# Patient Record
Sex: FEMALE | Race: OTHER | NOT HISPANIC OR LATINO | URBAN - METROPOLITAN AREA
[De-identification: names, ages, dates, MRNs, and addresses within clinical notes are randomized per-mention and may not be internally consistent; named-entity substitution may affect disease eponyms.]

---

## 2021-07-15 ENCOUNTER — OUTPATIENT (OUTPATIENT)
Dept: OUTPATIENT SERVICES | Facility: HOSPITAL | Age: 56
LOS: 1 days | End: 2021-07-15
Payer: MEDICARE

## 2021-07-15 ENCOUNTER — RESULT REVIEW (OUTPATIENT)
Age: 56
End: 2021-07-15

## 2021-07-15 ENCOUNTER — APPOINTMENT (OUTPATIENT)
Dept: ORTHOPEDIC SURGERY | Facility: CLINIC | Age: 56
End: 2021-07-15
Payer: MEDICARE

## 2021-07-15 VITALS
HEIGHT: 63 IN | OXYGEN SATURATION: 98 % | HEART RATE: 100 BPM | WEIGHT: 172 LBS | DIASTOLIC BLOOD PRESSURE: 78 MMHG | BODY MASS INDEX: 30.48 KG/M2 | TEMPERATURE: 98.6 F | SYSTOLIC BLOOD PRESSURE: 135 MMHG

## 2021-07-15 DIAGNOSIS — S80.12XA CONTUSION OF LEFT LOWER LEG, INITIAL ENCOUNTER: ICD-10-CM

## 2021-07-15 DIAGNOSIS — Z78.9 OTHER SPECIFIED HEALTH STATUS: ICD-10-CM

## 2021-07-15 DIAGNOSIS — Z87.09 PERSONAL HISTORY OF OTHER DISEASES OF THE RESPIRATORY SYSTEM: ICD-10-CM

## 2021-07-15 DIAGNOSIS — S83.412A SPRAIN OF MEDIAL COLLATERAL LIGAMENT OF LEFT KNEE, INITIAL ENCOUNTER: ICD-10-CM

## 2021-07-15 DIAGNOSIS — S89.92XA UNSPECIFIED INJURY OF LEFT LOWER LEG, INITIAL ENCOUNTER: ICD-10-CM

## 2021-07-15 PROCEDURE — 73590 X-RAY EXAM OF LOWER LEG: CPT

## 2021-07-15 PROCEDURE — 73590 X-RAY EXAM OF LOWER LEG: CPT | Mod: 26,LT

## 2021-07-15 PROCEDURE — 73610 X-RAY EXAM OF ANKLE: CPT

## 2021-07-15 PROCEDURE — 99204 OFFICE O/P NEW MOD 45 MIN: CPT

## 2021-07-15 PROCEDURE — 73610 X-RAY EXAM OF ANKLE: CPT | Mod: 26,LT

## 2021-07-15 RX ORDER — ALBUTEROL SULFATE 2.5 MG/3ML
(2.5 MG/3ML) SOLUTION RESPIRATORY (INHALATION)
Refills: 0 | Status: ACTIVE | COMMUNITY

## 2021-07-15 RX ORDER — ZILEUTON 600 MG/1
600 TABLET, FILM COATED, EXTENDED RELEASE ORAL
Refills: 0 | Status: ACTIVE | COMMUNITY

## 2021-07-15 RX ORDER — BUDESONIDE 0.25 MG/2ML
0.25 INHALANT ORAL
Refills: 0 | Status: ACTIVE | COMMUNITY
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
## 2021-07-15 NOTE — DISCUSSION/SUMMARY
[Medication Risks Reviewed] : Medication risks reviewed [de-identified] : The patient is a 55 year old woman presenting with an 8 day history of acute, left medial knee and shin pain.  MRI showing MCL sprain, but I believe her pain is mostly related to moderate contusion of the left posteromedial tibial shaft.  No evidence of compartment syndrome.\par \par Imaging/Diagnostics/Medical Records were interpreted and/or reviewed and results were reviewed with the patient in detail.  All questions were answered appropriately.\par \par The patient was counseled on the natural progression of the problem(s) today and potential complications of diagnoses.  The patient was provided reassurance today.\par \par After informed patient consent, it has been agreed upon to trial a course of conservative, nonoperative treatment.\par \par Discussed serial observation.\par \par The patient was counseled on Rest-Ice-Compression-Elevation (RICE).\par \par Patient instructed to offload area.  She was fitted with walking cane today.\par \par Patient was prescribed a short course of Etodolac.Appropriate use of medication was reviewed with the patient in detail. Risks, benefits, and adverse effects medication were discussed.\par \par If pain worsens, we discussed ruling out nondisplaced fracture.  MRI Tibia-Fibula ordered today. \par \par She was counsele don red flags warranting presentation to ED, or earlier follow-up.\par \par Follow-up in 2 weeks.  Pending progress, we discussed starting supervised PT.\par \par ------------------------------------------------------------------------------------------------------------------\par Patient appreciates and agrees with current plan.\par \par The patient's diagnosis above was evaluated by me, personally.  Diagnostic Testing and treatment options were discussed with the patient in detail.  The risks/benefits/potential complications of diagnostic testing/treatments were described in detail.  \par \par This note was generated using a mixture of manual typing and dragon medical dictation software.  A reasonable effort has been made for proofreading its contents, but typos may still remain.  If there are any questions or points of clarification needed please notify my office.\par \par \par >45 minutes of time was spent on total encounter.  >50% of the visit was spent on face-to-face counseling/coordination of care and medical-decision making for this patient.\par \par \par

## 2021-07-15 NOTE — PHYSICAL EXAM
[de-identified] : General: Well-nourished, well-developed, alert, and in no acute distress.\par Head: Normocephalic.\par Eyes: Pupils equal round reactive to light and accommodation, extraocular muscles intact, normal sclera.\par Nose: No nasal discharge.\par Cardiac: Regular rate. Extremities are warm and well perfused. Distal pulses are symmetric bilaterally.\par Respiratory: No labored breathing.\par Extremities: Sensation is intact distally bilaterally.  Distal pulses are symmetric bilaterally\par Lymphatic: No regional lymphadenopathy, no lymphedema\par Neurologic: No focal deficits\par Skin: Normal skin color, texture, and turgor\par Psychiatric: Normal affect\par MSK: as noted above/below\par \par \par RIGHT KNEE:\par \par Inspection: no bruising, swelling, erythema\par Joint Effusion:no \par ROM: Knee Flexion 130-140 , Knee Extension 0\par Palpation:No pain at joint line, patellar tendon, MFC/LFC, Medial/Lateral Tibial Plateau\par Leg Length Discrepancy:no\par Patella: no apprehension\par Distal Pulses: normal\par Lower Extremity Strength:normal, 5/5 \par Lower Extremity Reflexes:normal, 2+\par Lower Extremity Sensation: normal\par \par Special Tests:\par Eric:Negative \par Hermila: Negative\par Anterior Drawer:Negative\par Anterior Lachman:Negative\par Posterior Drawer:Negative \par Varus/Valgus:Negative, no instability\par \par LEFT KNEE:\par \par Inspection: no bruising, erythema\par Joint Effusion:TRACE\par ROM: Knee Flexion 120 WITH PAIN , Knee Extension +5\par Palpation:MIDL MEDIAL JOINT LINE PAIN, MCL FOOTPRINT TENDERNESS, No pain at patellar tendon, MFC/LFC, Medial/Lateral Tibial Plateau\par Leg Length Discrepancy:no\par Patella: no apprehension\par Distal Pulses: normal\par Lower Extremity Strength:KNEE EXTENSION 4+/5, KNEE FLEXION 5-/5\par Lower Extremity Reflexes:normal, 2+\par Lower Extremity Sensation: normal\par \par Special Tests:\par [facility]:Negative \par Hermila: Negative\par Anterior Drawer:Negative\par Posterior Drawer:Negative \par Varus/Valgus:PAIN WITH VALGUS STRESS, WITHOUT GAPPING\par \par RIGHT LEG:\par \par Inspection: no bruising, swelling or rash\par Bony Prominence: none\par Range of Motion: Knee flexion 130 degrees, knee extension 0 degrees\par Palpation: no tenderness posterior medial tibia, anterior tibial cortex,  no calf pain\par Leg length discrepancy: no\par Distal Pulses: Intact\par Lower extremity strength: Knee Extension 5/5 with no pain, Knee Flexion 5/5 with no pain, Hip Flexion 5/5 with no pain, Hip Abduction 5/5 with no pain, PlantarFlexion 5/5, Dorsiflexion 5/5, Eversion 5/5, Inversion 5/5  \par Lower extremity reflexes: 2 Plus \par Lower extremity sensation: Intact\par \par Special Test:\par Rose Sign: Negative\par Hurtado Test: Negative\par \par LEFT LEG:\par \par Inspection: no bruising, swelling or rash\par Bony Prominence: none\par Range of Motion: Knee flexion 120 degrees, knee extension 0 degrees\par Palpation: PAIN AT MID TO DISTAL THIRD POSTEROMEDIAL TIBIA, anterior tibial cortex,  no calf pain\par Leg length discrepancy: no\par Distal Pulses: Intact\par Lower extremity strength: Knee Extension 4+/5 with pain, Knee Flexion 5-/5 with no pain, Hip Flexion 5/5 with no pain, Hip Abduction 5/5 with no pain, PlantarFlexion 5/5, Dorsiflexion 4+/5, Eversion 5/5, Inversion 5/5 \par Lower extremity reflexes: 2 Plus \par Lower extremity sensation: Intact\par \par Special Test:\par Rose Sign: Negative\par Hurtado Test: Negative\par \par  [de-identified] : MRI Left Knee - Multiple views were reviewed with the patient in detail.\par \par 1.  Grade 1 ACL sprain\par 2.  Posterior horn medial meniscus degeneration\par 3.  Edema at the level of the popliteal hiatus and tendon series 301 image number 3\par 4.  Grade 2 MCL sprain\par \par Xray LEFT Tibia-Fibula and Ankle - Multiple views were reviewed with the patient in detail.  There is no acute fracture or dislocation.  There is no joint effusion.  Joint spaces are preserved. We will await formal radiology reading.\par \par

## 2021-07-16 RX ORDER — DICLOFENAC SODIUM 50 MG/1
50 TABLET, DELAYED RELEASE ORAL
Qty: 180 | Refills: 0 | Status: ACTIVE | COMMUNITY
Start: 2021-07-16 | End: 1900-01-01

## 2021-07-16 RX ORDER — ETODOLAC 400 MG/1
400 TABLET, FILM COATED ORAL TWICE DAILY
Qty: 60 | Refills: 3 | Status: DISCONTINUED | COMMUNITY
Start: 2021-07-15 | End: 2021-07-16

## 2021-07-29 ENCOUNTER — APPOINTMENT (OUTPATIENT)
Dept: ORTHOPEDIC SURGERY | Facility: CLINIC | Age: 56
End: 2021-07-29
Payer: MEDICARE

## 2021-07-29 VITALS
BODY MASS INDEX: 30.48 KG/M2 | HEIGHT: 63 IN | SYSTOLIC BLOOD PRESSURE: 130 MMHG | DIASTOLIC BLOOD PRESSURE: 80 MMHG | WEIGHT: 172 LBS | TEMPERATURE: 98.2 F | OXYGEN SATURATION: 98 % | HEART RATE: 103 BPM

## 2021-07-29 DIAGNOSIS — S80.12XD CONTUSION OF LEFT LOWER LEG, SUBSEQUENT ENCOUNTER: ICD-10-CM

## 2021-07-29 DIAGNOSIS — S83.412D SPRAIN OF MEDIAL COLLATERAL LIGAMENT OF LEFT KNEE, SUBSEQUENT ENCOUNTER: ICD-10-CM

## 2021-07-29 PROCEDURE — 99212 OFFICE O/P EST SF 10 MIN: CPT
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
## 2021-07-29 NOTE — DISCUSSION/SUMMARY
[Medication Risks Reviewed] : Medication risks reviewed [de-identified] : The patient is a 55 year old woman presenting with an 3 week history of acute, left medial knee and shin pain.  She likely has Grade II MCL sprain with proximo-medial tibia contusion\par \par Imaging/Diagnostics/Medical Records were interpreted and/or reviewed and results were reviewed with the patient in detail.  All questions were answered appropriately.\par \par The patient was counseled on the natural progression of the problem(s) today and potential complications of diagnoses.  The patient was provided reassurance today.\par \par Transition to hinged knee brace for next 4 weeks.  Patient given prescription today.\par \par As pain improves, start supervised PT in next 1-2 weeks.\par \par Patient was prescribed a course of physical therapy today.  The patient was also provided some general home exercises.  The patient was counseled on activity modification.\par \par Follow-up in 4 weeks.  Patient understands that MCL sprain may take 4-8 weeks to heal, and bone contusion may take 2-3 months or more to return to baseline\par \par ------------------------------------------------------------------------------------------------------------------\par Patient appreciates and agrees with current plan.\par \par The patient's diagnosis above was evaluated by me, personally.  Diagnostic Testing and treatment options were discussed with the patient in detail.  The risks/benefits/potential complications of diagnostic testing/treatments were described in detail.  \par \par This note was generated using a mixture of manual typing and dragon medical dictation software.  A reasonable effort has been made for proofreading its contents, but typos may still remain.  If there are any questions or points of clarification needed please notify my office.\par \par \par >15 minutes of time was spent on total encounter.  >50% of the visit was spent on face-to-face counseling/coordination of care and medical-decision making for this patient.\par \par \par

## 2021-07-29 NOTE — PHYSICAL EXAM
[de-identified] : General: Well-nourished, well-developed, alert, and in no acute distress.\par Head: Normocephalic.\par Eyes: Pupils equal round reactive to light and accommodation, extraocular muscles intact, normal sclera.\par Nose: No nasal discharge.\par Cardiac: Regular rate. Extremities are warm and well perfused. Distal pulses are symmetric bilaterally.\par Respiratory: No labored breathing.\par Extremities: Sensation is intact distally bilaterally.  Distal pulses are symmetric bilaterally\par Lymphatic: No regional lymphadenopathy, no lymphedema\par Neurologic: No focal deficits\par Skin: Normal skin color, texture, and turgor\par Psychiatric: Normal affect\par MSK: as noted above/below\par \par \par RIGHT KNEE:\par \par Inspection: no bruising, swelling, erythema\par Joint Effusion:no \par ROM: Knee Flexion 130-140 , Knee Extension 0\par Palpation:No pain at joint line, patellar tendon, MFC/LFC, Medial/Lateral Tibial Plateau\par Leg Length Discrepancy:no\par Patella: no apprehension\par Distal Pulses: normal\par Lower Extremity Strength:normal, 5/5 \par Lower Extremity Reflexes:normal, 2+\par Lower Extremity Sensation: normal\par \par Special Tests:\par Eric:Negative \par Hermila: Negative\par Anterior Drawer:Negative\par Anterior Lachman:Negative\par Posterior Drawer:Negative \par Varus/Valgus:Negative, no instability\par \par LEFT KNEE:\par \par Inspection: no bruising, erythema\par Joint Effusion:none\par ROM: Knee Flexion 120 WITH PAIN , Knee Extension +5\par Palpation:MILD MEDIAL JOINT LINE PAIN, MCL FOOTPRINT TENDERNESS, No pain at patellar tendon, MFC/LFC, Medial/Lateral Tibial Plateau\par Leg Length Discrepancy:no\par Patella: no apprehension\par Distal Pulses: normal\par Lower Extremity Strength:KNEE EXTENSION 4+/5, KNEE FLEXION 5-/5\par Lower Extremity Reflexes:normal, 2+\par Lower Extremity Sensation: normal\par \par Special Tests:\par Eric:Negative \par Hermila: Negative\par Anterior Drawer:Negative\par Posterior Drawer:Negative \par Varus/Valgus:PAIN WITH VALGUS STRESS, WITHOUT GAPPING\par \par RIGHT LEG:\par \par Inspection: no bruising, swelling or rash\par Bony Prominence: none\par Range of Motion: Knee flexion 130 degrees, knee extension 0 degrees\par Palpation: no tenderness posterior medial tibia, anterior tibial cortex,  no calf pain\par Leg length discrepancy: no\par Distal Pulses: Intact\par Lower extremity strength: Knee Extension 5/5 with no pain, Knee Flexion 5/5 with no pain, Hip Flexion 5/5 with no pain, Hip Abduction 5/5 with no pain, PlantarFlexion 5/5, Dorsiflexion 5/5, Eversion 5/5, Inversion 5/5  \par Lower extremity reflexes: 2 Plus \par Lower extremity sensation: Intact\par \par Special Test:\par Rose Sign: Negative\par Hurtado Test: Negative\par \par LEFT LEG:\par \par Inspection: no bruising, swelling or rash\par Bony Prominence: none\par Range of Motion: Knee flexion 120 degrees, knee extension 0 degrees\par Palpation: TRACE PAIN AT MID TO DISTAL THIRD POSTEROMEDIAL TIBIA, anterior tibial cortex,  no calf pain\par Leg length discrepancy: no\par Distal Pulses: Intact\par Lower extremity strength: Knee Extension 4+/5 with pain, Knee Flexion 5-/5 with no pain, Hip Flexion 5/5 with no pain, Hip Abduction 5/5 with no pain, PlantarFlexion 5/5, Dorsiflexion 4+/5, Eversion 5/5, Inversion 5/5 \par Lower extremity reflexes: 2 Plus \par Lower extremity sensation: Intact\par \par Special Test:\par Rose Sign: Negative\par Hurtado Test: Negative\par \par

## 2022-01-14 ENCOUNTER — APPOINTMENT (OUTPATIENT)
Dept: ORTHOPEDIC SURGERY | Facility: CLINIC | Age: 57
End: 2022-01-14
Payer: MEDICARE

## 2022-01-14 VITALS
DIASTOLIC BLOOD PRESSURE: 80 MMHG | HEART RATE: 89 BPM | BODY MASS INDEX: 30.48 KG/M2 | OXYGEN SATURATION: 98 % | SYSTOLIC BLOOD PRESSURE: 110 MMHG | HEIGHT: 63 IN | TEMPERATURE: 98 F | WEIGHT: 172 LBS

## 2022-01-14 DIAGNOSIS — S82.65XA NONDISPLACED FRACTURE OF LATERAL MALLEOLUS OF LEFT FIBULA, INITIAL ENCOUNTER FOR CLOSED FRACTURE: ICD-10-CM

## 2022-01-14 DIAGNOSIS — S93.492A SPRAIN OF OTHER LIGAMENT OF LEFT ANKLE, INITIAL ENCOUNTER: ICD-10-CM

## 2022-01-14 DIAGNOSIS — M25.572 PAIN IN LEFT ANKLE AND JOINTS OF LEFT FOOT: ICD-10-CM

## 2022-01-14 PROCEDURE — 99212 OFFICE O/P EST SF 10 MIN: CPT

## 2022-01-14 RX ORDER — TRAMADOL HYDROCHLORIDE 50 MG/1
50 TABLET, COATED ORAL
Qty: 21 | Refills: 0 | Status: ACTIVE | COMMUNITY
Start: 2022-01-14 | End: 1900-01-01

## 2022-01-14 NOTE — PHYSICAL EXAM
[de-identified] : General: Well-nourished, well-developed, alert, and in no acute distress.\par Head: Normocephalic.\par Eyes: Pupils equal round reactive to light and accommodation, extraocular muscles intact, normal sclera.\par Nose: No nasal discharge.\par Cardiac: Regular rate. Extremities are warm and well perfused. Distal pulses are symmetric bilaterally.\par Respiratory: No labored breathing.\par Extremities: Sensation is intact distally bilaterally.  Distal pulses are symmetric bilaterally\par Lymphatic: No regional lymphadenopathy, no lymphedema\par Neurologic: No focal deficits\par Skin: Normal skin color, texture, and turgor\par Psychiatric: Normal affect\par MSK: as noted above/below\par \par \par RIGHT ANKLE:\par \par Inspection: no swelling, ecchymosis, gross deformity\par Palpation: NO pain at ATFL, CFL,deltoid ligament, joint line pain, lateral malleolus pain, medial malleolus pain, achilles pain,  fibular head pain, 5th metatarsal pain, tarsal or distal phalanx pain\par ROM: normal ankle dorsiflexion, ankle plantarflexion, eversion, inversion\par Strength: 5/5\par Neurovascular: 2+ pulses distally\par Sensation: normal\par \par Special Tests: \par Anterior Drawer: Negative\par Talar Tilt: Negative\par Lower Extremity Squeeze/Compression: Negative\par External Rotation at Tib-Fib Syndesmosis: Negative\par Hurtado Test: Negative\par \par \par LEFT ANKLE:\par \par Inspection: no gross deformity, LATERAL SOFT TISSUE SWELLING, ECCHYMOSIS AT SUBTALAR AREA\par Palpation: PAIN AT ATFL, PAIN AT CFL, PAIN AT LATERAL MALLEOLUS, NO pain atdeltoid ligament, medial malleolus pain, achilles pain,  fibular head pain, 5th metatarsal pain, tarsal or distal phalanx pain\par ROM: LIMITED CIRCUMDUCTION\par Strength: 4+/5\par Neurovascular: 2+ pulses distally\par Sensation: normal\par \par Special Tests: \par Anterior Drawer: POSITIVE, 1+\par Talar Tilt: PAINFUL\par Lower Extremity Squeeze/Compression: Negative\par External Rotation at Tib-Fib Syndesmosis: Negative\par Hurtado Test: Negative\par Gait: ANTALGIC\par  [de-identified] : Xray LEFT Ankle/Foot from [facility] on 1/12/2022 - Multiple views were reviewed with the patient in detail. \par \par Cortical irregularity with a thin curvilinear lucency at the distal fibular tip is concerning for a small chip avulsion fracture with adjacent soft tissue swelling.\par The ankle mortise and talar dome are preserved.\par Mild first MTP joint space narrowing.\par Multipartite os peroneum.\par Lakeshia deformity and small Achilles enthesophytes.\par Tiny plantar calcaneal spur.\par Generalized osteopenia. \par
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
## 2022-01-14 NOTE — DISCUSSION/SUMMARY
[Medication Risks Reviewed] : Medication risks reviewed [de-identified] : The patient is a 56 year old woman presenting with a 3 day history of acute left lateral ankle pain after inversion injury.  She likely sustained distal fibula avulsion fracture with ATFL/CFL sprain - moderate-severe.\par \par Imaging/Diagnostics/Medical Records were interpreted and/or reviewed and results were reviewed with the patient in detail.  All questions were answered appropriately.\par \par The patient was counseled on the natural progression of the problem(s) today and potential complications of diagnoses.  The patient was provided reassurance today.\par \par After informed patient consent, it has been agreed upon to trial a course of conservative, nonoperative treatment.\par \par Patient fitted in aircast ankle brace today.\par \par Trial CAM walking boot x 4 weeks.  Rx given today.\par \par The patient was counseled on Protection-Rest-Ice-Compression-Elevation (PRICE).\par \par Gentle home exercises as instructed.\par \par Patient understands she may require PT, and resolution of symptoms may take up to 3-6 months.\par \par Patient was prescribed a short course of Tramadol for breakthrough pain.  Prior to prescribing, history of the patient's scheduled medication use was reviewed utilizing the I:STOP [place]  aware program.  Appropriate use of medication was discussed with the patient in detail.  The risks, benefits, adverse effects, alternative options were discussed.  The patient expressed understanding.\par \par Follow-up in 4 weeks.\par \par ------------------------------------------------------------------------------------------------------------------\par Patient appreciates and agrees with current plan.\par \par The patient's diagnosis above was evaluated by me, personally.  Diagnostic Testing and treatment options were discussed with the patient in detail.  The risks/benefits/potential complications of diagnostic testing/treatments were described in detail.  \par \par This note was generated using a mixture of manual typing and dragon medical dictation software.  A reasonable effort has been made for proofreading its contents, but typos may still remain.  If there are any questions or points of clarification needed please notify my office.\par \par \par >15 minutes of time was spent on total encounter.  >50% of the visit was spent on face-to-face counseling/coordination of care and medical-decision making for this patient.\par \par